# Patient Record
Sex: FEMALE | Race: WHITE | Employment: STUDENT | URBAN - METROPOLITAN AREA
[De-identification: names, ages, dates, MRNs, and addresses within clinical notes are randomized per-mention and may not be internally consistent; named-entity substitution may affect disease eponyms.]

---
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
## 2020-12-20 ENCOUNTER — HOSPITAL ENCOUNTER (OUTPATIENT)
Age: 20
Discharge: HOME | End: 2020-12-20
Payer: COMMERCIAL

## 2020-12-20 ENCOUNTER — APPOINTMENT (OUTPATIENT)
Dept: GENERAL RADIOLOGY | Age: 20
End: 2020-12-20
Attending: NURSE PRACTITIONER
Payer: COMMERCIAL

## 2020-12-20 VITALS
HEART RATE: 72 BPM | TEMPERATURE: 98 F | RESPIRATION RATE: 16 BRPM | OXYGEN SATURATION: 100 % | SYSTOLIC BLOOD PRESSURE: 125 MMHG | DIASTOLIC BLOOD PRESSURE: 62 MMHG

## 2020-12-20 DIAGNOSIS — S62.663A CLOSED NONDISPLACED FRACTURE OF DISTAL PHALANX OF LEFT MIDDLE FINGER, INITIAL ENCOUNTER: ICD-10-CM

## 2020-12-20 DIAGNOSIS — S67.10XA CRUSH INJURY TO FINGER: Primary | ICD-10-CM

## 2020-12-20 PROCEDURE — 99202 OFFICE O/P NEW SF 15 MIN: CPT | Performed by: NURSE PRACTITIONER

## 2020-12-20 PROCEDURE — A4570 SPLINT: HCPCS | Performed by: NURSE PRACTITIONER

## 2020-12-20 PROCEDURE — 73140 X-RAY EXAM OF FINGER(S): CPT | Performed by: NURSE PRACTITIONER

## 2020-12-20 NOTE — ED PROVIDER NOTES
Patient Seen in: [facility]      History   Patient presents with:  Arm or Hand Injury: Smashed finger under a 45lb weight. - Entered by patient  Finger Injury    Stated Complaint: Arm or Hand Injury - Smashed finger under a 45lb weight.     20- acute distress. Appearance: Normal appearance. She is not ill-appearing. HENT:      Head: Normocephalic. Mouth/Throat:      Mouth: Mucous membranes are moist.   Cardiovascular:      Rate and Rhythm: Normal rate.    Pulmonary:      Effort: Pulmona phalanx. Finger splint was applied. Respiratory clear. Encouraged take ibuprofen to help with pain. To follow-up with orthopedics. Patient verbalized understanding agree with plan of care.                          Disposition and Plan     Clinical Impr

## 2020-12-20 NOTE — ED INITIAL ASSESSMENT (HPI)
Patient crushed her left 3rd finger between the ground and a 45 pound weight. She has sensation to her finger tip but a lot of swelling and bruising noted.